# Patient Record
Sex: FEMALE | URBAN - METROPOLITAN AREA
[De-identification: names, ages, dates, MRNs, and addresses within clinical notes are randomized per-mention and may not be internally consistent; named-entity substitution may affect disease eponyms.]

---

## 2017-02-03 ENCOUNTER — TELEPHONE (OUTPATIENT)
Dept: ORTHOPEDIC SURGERY | Age: 77
End: 2017-02-03

## 2017-02-03 DIAGNOSIS — S42.291G: Primary | ICD-10-CM

## 2017-02-03 NOTE — TELEPHONE ENCOUNTER
Patient called and canceled her surgery that was scheduled for March 22, 2017. She feels that she has too much on her right now to proceed with surgery.

## 2017-02-14 DIAGNOSIS — S43.004A SHOULDER DISLOCATION, RIGHT, INITIAL ENCOUNTER: ICD-10-CM

## 2017-02-14 DIAGNOSIS — S42.291A OTHER CLOSED DISPLACED FRACTURE OF PROXIMAL END OF RIGHT HUMERUS, INITIAL ENCOUNTER: ICD-10-CM

## 2017-02-14 NOTE — TELEPHONE ENCOUNTER
Last Visit: 12/07/2016 with MD Vinay Stokes    Next Appointment: 03/17/2017 with MD Vinay Stokes   Previous Refill Encounters: 12/07/2016 per JONI Luciano #[id number]     Requested Prescriptions     Pending Prescriptions Disp Refills    oxyCODONE-acetaminophen (PERCOCET) 5-325 mg per tablet 40 Tab 0     Sig: Take 1 Tab by mouth every four (4) hours as needed for Pain. Max Daily Amount: 6 Tabs.

## 2017-02-15 RX ORDER — OXYCODONE AND ACETAMINOPHEN 5; 325 MG/1; MG/1
1 TABLET ORAL
Qty: 40 TAB | Refills: 0 | Status: SHIPPED | OUTPATIENT
Start: 2017-02-15

## 2017-02-17 ENCOUNTER — TELEPHONE (OUTPATIENT)
Dept: ORTHOPEDIC SURGERY | Age: 77
End: 2017-02-17

## 2017-02-17 NOTE — TELEPHONE ENCOUNTER
Physical therapy would be only other option.  She should f/u with [facility] to discuss as case is complicated

## 2017-02-17 NOTE — TELEPHONE ENCOUNTER
Patient would like to know if she does not want to proceed with surgery, what would be the next course of treatment. Please advise.

## 2017-02-20 NOTE — TELEPHONE ENCOUNTER
Patient calling back to speak with either the nurse who called her or Sebastian Milan regarding other options.

## 2017-02-20 NOTE — TELEPHONE ENCOUNTER
Spoke with patient and advised her that if she did not want to go thru with her surgery that per Wilian Berry PA-C - physical therapy is the only other option and that she should f/u with [facility] to discuss case. Patient stated she would call back to schedule a follow up with [facility].

## 2019-03-18 NOTE — TELEPHONE ENCOUNTER
Patient requests a refill of Percocet to  at the [facility] location. oral